# Patient Record
Sex: FEMALE | Race: WHITE | NOT HISPANIC OR LATINO | Employment: UNEMPLOYED | URBAN - METROPOLITAN AREA
[De-identification: names, ages, dates, MRNs, and addresses within clinical notes are randomized per-mention and may not be internally consistent; named-entity substitution may affect disease eponyms.]

---

## 2017-08-15 ENCOUNTER — PATIENT OUTREACH (OUTPATIENT)
Dept: ADMINISTRATIVE | Facility: HOSPITAL | Age: 42
End: 2017-08-15

## 2017-08-15 NOTE — PROGRESS NOTES
Attempt 1 to contact patient regarding scheduling for physical. No answer. No voice mail or unable to leave a voice mail message.  No letter mailed to patient.

## 2017-08-16 ENCOUNTER — PATIENT OUTREACH (OUTPATIENT)
Dept: ADMINISTRATIVE | Facility: HOSPITAL | Age: 42
End: 2017-08-16

## 2017-08-16 NOTE — LETTER
August 16, 2017    Hanna Jennings  [address] [address] [facility] [address]             [facility]  [address]  [facility] [address]  Phone: [phone]    Dear Hanna Jennings     In the spirit of maintaining your good health, our system indicates that you have not been seen in the office in over 12 months and due for a visit.     Our system indicates that you are due for the following:    TETANUS VACCINE  05/19/1993    Mammogram  05/20/2017    Influenza Vaccine  08/01/2017       E Boo Su Jr, MD would like you to schedule an appointment for an annual exam at your earliest convenience. If you have completed any of these health maintenance requirements at an outside facility, please contact our office so we may update your health record.    If your PRIMARY CARE PHYSICIAN has changed please contact your insurance company to reflect the correct physician.    If you have any issues or need assistance in scheduling this appointment, please call the appointment desk [phone] or call the number below.      Tisha KENNEDY LPN Care Coordinator  Care Coordination Department  [facility]  [phone]